# Patient Record
Sex: FEMALE | Race: BLACK OR AFRICAN AMERICAN | NOT HISPANIC OR LATINO | Employment: FULL TIME | URBAN - METROPOLITAN AREA
[De-identification: names, ages, dates, MRNs, and addresses within clinical notes are randomized per-mention and may not be internally consistent; named-entity substitution may affect disease eponyms.]

---

## 2023-03-28 PROBLEM — O09.30 LATE PRENATAL CARE (HHS-HCC): Status: ACTIVE | Noted: 2023-03-28

## 2023-03-28 PROBLEM — O99.519 ASTHMA AFFECTING PREGNANCY, ANTEPARTUM (HHS-HCC): Status: ACTIVE | Noted: 2023-03-28

## 2023-03-28 PROBLEM — O09.521 AMA (ADVANCED MATERNAL AGE) MULTIGRAVIDA 35+, FIRST TRIMESTER (HHS-HCC): Status: ACTIVE | Noted: 2023-03-28

## 2023-03-28 PROBLEM — O09.523 AMA (ADVANCED MATERNAL AGE) MULTIGRAVIDA 35+, THIRD TRIMESTER (HHS-HCC): Status: ACTIVE | Noted: 2023-03-28

## 2023-03-28 PROBLEM — O09.529 AMA (ADVANCED MATERNAL AGE) MULTIGRAVIDA 35+ (HHS-HCC): Status: ACTIVE | Noted: 2023-03-28

## 2023-03-28 PROBLEM — G44.209 TENSION HEADACHE: Status: ACTIVE | Noted: 2023-03-28

## 2023-03-28 PROBLEM — R87.612 LOW GRADE SQUAMOUS INTRAEPITHELIAL LESION ON CYTOLOGIC SMEAR OF CERVIX (LGSIL): Status: ACTIVE | Noted: 2023-03-28

## 2023-03-28 PROBLEM — N87.1 CIN II (CERVICAL INTRAEPITHELIAL NEOPLASIA II): Status: ACTIVE | Noted: 2023-03-28

## 2023-03-28 PROBLEM — O99.019 ANEMIA AFFECTING PREGNANCY (HHS-HCC): Status: ACTIVE | Noted: 2023-03-28

## 2023-03-28 PROBLEM — J45.909 ASTHMA AFFECTING PREGNANCY, ANTEPARTUM (HHS-HCC): Status: ACTIVE | Noted: 2023-03-28

## 2023-03-28 PROBLEM — R45.86 MOOD SWINGS: Status: ACTIVE | Noted: 2023-03-28

## 2023-03-28 RX ORDER — ALBUTEROL SULFATE 90 UG/1
1-2 AEROSOL, METERED RESPIRATORY (INHALATION)
COMMUNITY
Start: 2017-10-11

## 2023-03-28 RX ORDER — FERROUS SULFATE 325(65) MG
1 TABLET ORAL 2 TIMES DAILY
COMMUNITY
Start: 2018-03-08

## 2023-03-29 ENCOUNTER — TELEPHONE (OUTPATIENT)
Dept: PRIMARY CARE | Facility: CLINIC | Age: 43
End: 2023-03-29

## 2023-03-29 ENCOUNTER — OFFICE VISIT (OUTPATIENT)
Dept: PRIMARY CARE | Facility: CLINIC | Age: 43
End: 2023-03-29
Payer: COMMERCIAL

## 2023-03-29 VITALS
WEIGHT: 180 LBS | OXYGEN SATURATION: 98 % | SYSTOLIC BLOOD PRESSURE: 116 MMHG | HEART RATE: 95 BPM | HEIGHT: 67 IN | BODY MASS INDEX: 28.25 KG/M2 | RESPIRATION RATE: 16 BRPM | TEMPERATURE: 98.4 F | DIASTOLIC BLOOD PRESSURE: 81 MMHG

## 2023-03-29 DIAGNOSIS — R10.33 PERIUMBILICAL ABDOMINAL PAIN: Primary | ICD-10-CM

## 2023-03-29 DIAGNOSIS — K82.8 PORCELAIN GALLBLADDER: Primary | ICD-10-CM

## 2023-03-29 DIAGNOSIS — K59.00 CONSTIPATION, UNSPECIFIED CONSTIPATION TYPE: ICD-10-CM

## 2023-03-29 DIAGNOSIS — K80.61 CALCULUS OF GALLBLADDER AND BILE DUCT WITH CHOLECYSTITIS WITH OBSTRUCTION, UNSPECIFIED CHOLECYSTITIS ACUITY: ICD-10-CM

## 2023-03-29 PROCEDURE — 99203 OFFICE O/P NEW LOW 30 MIN: CPT | Performed by: STUDENT IN AN ORGANIZED HEALTH CARE EDUCATION/TRAINING PROGRAM

## 2023-03-29 RX ORDER — DOCUSATE SODIUM 100 MG/1
100 CAPSULE, LIQUID FILLED ORAL 3 TIMES DAILY PRN
Qty: 60 CAPSULE | Refills: 2 | Status: SHIPPED | OUTPATIENT
Start: 2023-03-29 | End: 2023-04-28

## 2023-03-29 ASSESSMENT — ENCOUNTER SYMPTOMS
ANAL BLEEDING: 0
NUMBNESS: 0
BLOOD IN STOOL: 0
DIZZINESS: 0
HEMATURIA: 0
ACTIVITY CHANGE: 0
WHEEZING: 0
ABDOMINAL PAIN: 1
DYSURIA: 0
WEAKNESS: 0
SHORTNESS OF BREATH: 0
RECTAL PAIN: 0
DIARRHEA: 0
CONSTIPATION: 1
VOMITING: 1
FEVER: 0
ABDOMINAL DISTENTION: 0
COUGH: 0
NAUSEA: 1
SPEECH DIFFICULTY: 0

## 2023-03-29 NOTE — PATIENT INSTRUCTIONS
It was nice meeting you today!    I have ordered a CT scan with contrast. As discussed, we have instances where people develop some allergic reactions to the contrast. If you experience this, you will be taken to the ER for further management.  I have also ordered some bloodwork, please get them done  If you experience sever abdominal pain or have nausea and vomiting, please proceed to the ER / seek medical attention immediately.

## 2023-03-29 NOTE — PROGRESS NOTES
"Subjective   Patient ID: Radha Holland is a 42 y.o. female who presents for abdominal pain   HPI  Here to establish care   Ongoing right upper quadrant and periumbilical abdominal pain since mid of feb  Reprots squeezing like pain, non diating , no agg or releiving factors   Ass with Nausea/ NBNB vomiting   Also reports constipation on and off and hard, stools since the start of this pain   No weightloss/ appetite loss;   Pain not related to food per patient    Meds: Alb PRN and BCP  Review of Systems   Constitutional:  Negative for activity change and fever.   HENT:  Negative for congestion.    Respiratory:  Negative for cough, shortness of breath and wheezing.    Cardiovascular:  Negative for chest pain and leg swelling.   Gastrointestinal:  Positive for abdominal pain, constipation, nausea and vomiting. Negative for abdominal distention, anal bleeding, blood in stool, diarrhea and rectal pain.   Endocrine: Negative for cold intolerance.   Genitourinary:  Negative for dysuria, hematuria and urgency.   Neurological:  Negative for dizziness, speech difficulty, weakness and numbness.   Psychiatric/Behavioral:  Negative for self-injury and suicidal ideas.        Objective   Visit Vitals  /81   Pulse 95   Temp 36.9 °C (98.4 °F)   Resp 16   Ht 1.689 m (5' 6.5\")   Wt 81.6 kg (180 lb)   SpO2 98%   BMI 28.62 kg/m²   OB Status Having periods   Smoking Status Never   BSA 1.96 m²      Physical Exam  HENT:      Head: Normocephalic and atraumatic.      Right Ear: Tympanic membrane normal.      Left Ear: Tympanic membrane normal.      Nose: Nose normal.      Mouth/Throat:      Mouth: Mucous membranes are moist.   Eyes:      Extraocular Movements: Extraocular movements intact.      Conjunctiva/sclera: Conjunctivae normal.      Pupils: Pupils are equal, round, and reactive to light.   Cardiovascular:      Rate and Rhythm: Normal rate and regular rhythm.      Pulses: Normal pulses.      Heart sounds: Normal heart sounds. "   Pulmonary:      Effort: Pulmonary effort is normal.      Breath sounds: Normal breath sounds. No stridor. No rhonchi.   Abdominal:      General: Bowel sounds are normal.      Palpations: Abdomen is soft.      Tenderness: There is abdominal tenderness (right upper quadrant and periumbilical). There is no right CVA tenderness, left CVA tenderness, guarding or rebound.   Musculoskeletal:      Cervical back: Neck supple.   Neurological:      Mental Status: She is oriented to person, place, and time.   Psychiatric:         Mood and Affect: Mood normal.         Behavior: Behavior normal.         Assessment/Plan         Problem List Items Addressed This Visit          Nervous    Periumbilical abdominal pain - Primary    Overview     Ongoing since feb  Worsening  Last episode of pain yesterday  Had nausea nad 1 episode of vomiting  No fever    Exam: RUQ and priumbilical tenderness  Will get STAT CT   Verbalizes understanding about contrast side effects  To goto ER if worsening of symptoms . Verbalizes understanding  Will get labs incl lipase         Relevant Orders    CT abdomen pelvis w IV contrast (Completed)    Comprehensive Metabolic Panel    CBC    Lipase     Other Visit Diagnoses       Constipation, unspecified constipation type               Patient to seek medical attention immediately / call 911  if has worsening of symptoms  or develops alarm symptoms as discussed. Verbalizes understanding

## 2023-03-29 NOTE — TELEPHONE ENCOUNTER
Result Communication    Resulted Orders   CT abdomen pelvis w IV contrast    Narrative    Interpreted By:  ROXANN WALLIS MD  MRN: [id number]  Patient Name: LATISHA STYLES     STUDY:  CT ABDOMEN AND PELVIS W IV CONTRAST;  3/29/2023 1:07 pm     INDICATION:  periumbilical pain with nausea dn vomiting; r/o GB path .     COMPARISON:  None.     ACCESSION NUMBER(S):  [id number]     ORDERING CLINICIAN:  GUILLE CURIEL     TECHNIQUE:  CT of the abdomen and pelvis was performed.  75 mL Omnipaque 350     FINDINGS:  LOWER CHEST:  Images of the lung bases show no infiltrate or pleural fluid.     ABDOMEN:     LIVER:  There is a lobular area of enhancement involving the dome of the  right lobe of the liver. This appears vascular in origin such as  vascular malformation.     BILE DUCTS:  There is no intrahepatic, common hepatic or common bile ductal  dilatation.     GALLBLADDER:  There are multiple gallstones in the distal body and fundus of the  gallbladder.  There is partial calcification of the wall of the gallbladder  consistent with porcelain gallbladder. This does not appear masslike  on CT imaging.     PANCREAS:  The pancreas is unremarkable.     SPLEEN:  The spleen is unremarkable. There is no splenic mass or splenomegaly.     ADRENAL GLANDS:  The adrenal glands are unremarkable.     KIDNEYS AND URETERS:  The kidneys function symmetrically.  The kidneys demonstrate no mass.  There is no intrarenal calculus or hydronephrosis.     BOWEL:  There is no bowel wall thickening, dilatation or obstruction.  There is a moderate amount of stool throughout the colon.     VESSELS:  The abdominal and pelvic vessels are unremarkable.     PERITONEUM/RETROPERITONEUM/LYMPH NODES:  There is no retroperitoneal or pelvic adenopathy.  There is no  ascites.     ABDOMINAL WALL:  The abdominal wall is unremarkable.     BONE AND SOFT TISSUE:  There is no acute osseous finding.     There is no soft tissue abnormality.       Impression     1. Cholelithiasis. Porcelain gallbladder.  2. Constipation.  3. Nonspecific lobular area of enhancement right lobe of the liver.  This is not the enhancement characteristics of a hemangioma. This has  the appearance of a vascular malformation.       2:26 PM      Results were successfully communicated with the patient and they acknowledged their understanding.

## 2023-04-07 ENCOUNTER — LAB (OUTPATIENT)
Dept: LAB | Facility: LAB | Age: 43
End: 2023-04-07
Payer: COMMERCIAL

## 2023-04-07 ENCOUNTER — APPOINTMENT (OUTPATIENT)
Dept: LAB | Facility: LAB | Age: 43
End: 2023-04-07
Payer: COMMERCIAL

## 2023-04-07 DIAGNOSIS — R10.33 PERIUMBILICAL ABDOMINAL PAIN: ICD-10-CM

## 2023-04-07 LAB
ALANINE AMINOTRANSFERASE (SGPT) (U/L) IN SER/PLAS: 157 U/L (ref 7–45)
ALANINE AMINOTRANSFERASE (SGPT) (U/L) IN SER/PLAS: 159 U/L (ref 7–45)
ALBUMIN (G/DL) IN SER/PLAS: 3.5 G/DL (ref 3.4–5)
ALBUMIN (G/DL) IN SER/PLAS: 3.7 G/DL (ref 3.4–5)
ALKALINE PHOSPHATASE (U/L) IN SER/PLAS: 111 U/L (ref 33–110)
ALKALINE PHOSPHATASE (U/L) IN SER/PLAS: 115 U/L (ref 33–110)
ANION GAP IN SER/PLAS: 12 MMOL/L (ref 10–20)
ANION GAP IN SER/PLAS: 13 MMOL/L (ref 10–20)
ASPARTATE AMINOTRANSFERASE (SGOT) (U/L) IN SER/PLAS: 124 U/L (ref 9–39)
ASPARTATE AMINOTRANSFERASE (SGOT) (U/L) IN SER/PLAS: 125 U/L (ref 9–39)
BILIRUBIN TOTAL (MG/DL) IN SER/PLAS: 2.4 MG/DL (ref 0–1.2)
BILIRUBIN TOTAL (MG/DL) IN SER/PLAS: 2.5 MG/DL (ref 0–1.2)
CALCIUM (MG/DL) IN SER/PLAS: 9.2 MG/DL (ref 8.6–10.6)
CALCIUM (MG/DL) IN SER/PLAS: 9.4 MG/DL (ref 8.6–10.6)
CARBON DIOXIDE, TOTAL (MMOL/L) IN SER/PLAS: 24 MMOL/L (ref 21–32)
CARBON DIOXIDE, TOTAL (MMOL/L) IN SER/PLAS: 26 MMOL/L (ref 21–32)
CHLORIDE (MMOL/L) IN SER/PLAS: 103 MMOL/L (ref 98–107)
CHLORIDE (MMOL/L) IN SER/PLAS: 104 MMOL/L (ref 98–107)
CREATININE (MG/DL) IN SER/PLAS: 0.87 MG/DL (ref 0.5–1.05)
CREATININE (MG/DL) IN SER/PLAS: 0.9 MG/DL (ref 0.5–1.05)
ERYTHROCYTE DISTRIBUTION WIDTH (RATIO) BY AUTOMATED COUNT: 22.2 % (ref 11.5–14.5)
ERYTHROCYTE DISTRIBUTION WIDTH (RATIO) BY AUTOMATED COUNT: 22.4 % (ref 11.5–14.5)
ERYTHROCYTE MEAN CORPUSCULAR HEMOGLOBIN CONCENTRATION (G/DL) BY AUTOMATED: 29.3 G/DL (ref 32–36)
ERYTHROCYTE MEAN CORPUSCULAR HEMOGLOBIN CONCENTRATION (G/DL) BY AUTOMATED: 29.7 G/DL (ref 32–36)
ERYTHROCYTE MEAN CORPUSCULAR VOLUME (FL) BY AUTOMATED COUNT: 70 FL (ref 80–100)
ERYTHROCYTE MEAN CORPUSCULAR VOLUME (FL) BY AUTOMATED COUNT: 70 FL (ref 80–100)
ERYTHROCYTES (10*6/UL) IN BLOOD BY AUTOMATED COUNT: 5.06 X10E12/L (ref 4–5.2)
ERYTHROCYTES (10*6/UL) IN BLOOD BY AUTOMATED COUNT: 5.06 X10E12/L (ref 4–5.2)
GFR FEMALE: 82 ML/MIN/1.73M2
GFR FEMALE: 85 ML/MIN/1.73M2
GLUCOSE (MG/DL) IN SER/PLAS: 128 MG/DL (ref 74–99)
GLUCOSE (MG/DL) IN SER/PLAS: 88 MG/DL (ref 74–99)
HEMATOCRIT (%) IN BLOOD BY AUTOMATED COUNT: 35.4 % (ref 36–46)
HEMATOCRIT (%) IN BLOOD BY AUTOMATED COUNT: 35.5 % (ref 36–46)
HEMOGLOBIN (G/DL) IN BLOOD: 10.4 G/DL (ref 12–16)
HEMOGLOBIN (G/DL) IN BLOOD: 10.5 G/DL (ref 12–16)
LEUKOCYTES (10*3/UL) IN BLOOD BY AUTOMATED COUNT: 4.3 X10E9/L (ref 4.4–11.3)
LEUKOCYTES (10*3/UL) IN BLOOD BY AUTOMATED COUNT: 5.3 X10E9/L (ref 4.4–11.3)
LIPASE (U/L) IN SER/PLAS: 44 U/L (ref 9–82)
NRBC (PER 100 WBCS) BY AUTOMATED COUNT: 0 /100 WBC (ref 0–0)
NRBC (PER 100 WBCS) BY AUTOMATED COUNT: 0 /100 WBC (ref 0–0)
PLATELETS (10*3/UL) IN BLOOD AUTOMATED COUNT: 394 X10E9/L (ref 150–450)
PLATELETS (10*3/UL) IN BLOOD AUTOMATED COUNT: 398 X10E9/L (ref 150–450)
POTASSIUM (MMOL/L) IN SER/PLAS: 4.6 MMOL/L (ref 3.5–5.3)
POTASSIUM (MMOL/L) IN SER/PLAS: 5 MMOL/L (ref 3.5–5.3)
PROTEIN TOTAL: 7 G/DL (ref 6.4–8.2)
PROTEIN TOTAL: 7.1 G/DL (ref 6.4–8.2)
SODIUM (MMOL/L) IN SER/PLAS: 136 MMOL/L (ref 136–145)
SODIUM (MMOL/L) IN SER/PLAS: 136 MMOL/L (ref 136–145)
UREA NITROGEN (MG/DL) IN SER/PLAS: 10 MG/DL (ref 6–23)
UREA NITROGEN (MG/DL) IN SER/PLAS: 10 MG/DL (ref 6–23)

## 2023-04-07 PROCEDURE — 83690 ASSAY OF LIPASE: CPT

## 2023-04-07 PROCEDURE — 85027 COMPLETE CBC AUTOMATED: CPT

## 2023-04-07 PROCEDURE — 36415 COLL VENOUS BLD VENIPUNCTURE: CPT

## 2023-04-07 PROCEDURE — 80053 COMPREHEN METABOLIC PANEL: CPT

## 2023-04-20 LAB
ALANINE AMINOTRANSFERASE (SGPT) (U/L) IN SER/PLAS: 241 U/L (ref 7–45)
ALBUMIN (G/DL) IN SER/PLAS: 3.8 G/DL (ref 3.4–5)
ALKALINE PHOSPHATASE (U/L) IN SER/PLAS: 109 U/L (ref 33–110)
ASPARTATE AMINOTRANSFERASE (SGOT) (U/L) IN SER/PLAS: 174 U/L (ref 9–39)
BILIRUBIN DIRECT (MG/DL) IN SER/PLAS: 0.6 MG/DL (ref 0–0.3)
BILIRUBIN TOTAL (MG/DL) IN SER/PLAS: 1.5 MG/DL (ref 0–1.2)
PROTEIN TOTAL: 7 G/DL (ref 6.4–8.2)

## 2023-05-04 ENCOUNTER — HOSPITAL ENCOUNTER (OUTPATIENT)
Dept: DATA CONVERSION | Facility: HOSPITAL | Age: 43
End: 2023-05-04
Attending: SURGERY | Admitting: SURGERY
Payer: COMMERCIAL

## 2023-05-04 DIAGNOSIS — K80.10 CALCULUS OF GALLBLADDER WITH CHRONIC CHOLECYSTITIS WITHOUT OBSTRUCTION: ICD-10-CM

## 2023-05-04 DIAGNOSIS — J45.909 UNSPECIFIED ASTHMA, UNCOMPLICATED (HHS-HCC): ICD-10-CM

## 2023-05-04 DIAGNOSIS — K80.20 CALCULUS OF GALLBLADDER WITHOUT CHOLECYSTITIS WITHOUT OBSTRUCTION: ICD-10-CM

## 2023-05-24 LAB
COMPLETE PATHOLOGY REPORT: NORMAL
CONVERTED CLINICAL DIAGNOSIS-HISTORY: NORMAL
CONVERTED FINAL DIAGNOSIS: NORMAL
CONVERTED FINAL REPORT PDF LINK TO COPY AND PASTE: NORMAL
CONVERTED GROSS DESCRIPTION: NORMAL

## 2023-09-14 NOTE — H&P
History & Physical Reviewed:   Pregnant/Lactating:  ·  Are You Pregnant no   ·  Are You Currently Breastfeeding no     I have reviewed the History and Physical dated:  07-Apr-2023   History and Physical reviewed and relevant findings noted. Patient examined to review pertinent physical  findings.: No significant changes   Home Medications Reviewed: no changes noted   Allergies Reviewed: no changes noted       ERAS (Enhanced Recovery After Surgery):  ·  ERAS Patient: no     Consent:   COVID-19 Consent:  ·  COVID-19 Risk Consent Surgeon has reviewed key risks related to the risk of betsey COVID-19 and if they contract COVID-19 what the risks are.     Attestation:   Note Completion:  I am a:  Resident/Fellow   Attending Attestation I saw and evaluated the patient.  I personally obtained the key and critical portions of the history and physical exam or was physically present for key and  critical portions performed by the resident/fellow. I reviewed the resident/fellow?s documentation and discussed the patient with the resident/fellow.  I agree with the resident/fellow?s medical decision making as documented in their note  with the exception/addition of the following:    I personally evaluated the patient on 04-May-2023   Comments/ Additional Findings    Pt seen in preop.  No changes since clinic visit          Electronic Signatures:  Dory Nicolas (Resident))  (Signed 04-May-2023 11:32)   Authored: History & Physical Reviewed, ERAS, Consent,  Note Completion  Franklin Patel)  (Signed 04-May-2023 12:16)   Authored: Note Completion   Co-Signer: History & Physical Reviewed, ERAS, Consent, Note Completion      Last Updated: 04-May-2023 12:16 by Franklin Patel)

## 2023-10-02 NOTE — OP NOTE
Post Operative Note:     PreOp Diagnosis: Gallstones   Post-Procedure Diagnosis: Same   Procedure: Lap Choly   Surgeon: Amanda   Resident/Fellow/Other Assistant: Maria Isabel   Anesthesia: GET   I.V. Fluids: 600 ml  crystalloid   Estimated Blood Loss (mL): none   Specimen: yes. GB   Findings: no acute inflamm     Attestation:   Note Completion:  Attending Attestation I was present for the entire procedure         Electronic Signatures:  Franklin Patel)  (Signed 04-May-2023 13:44)   Authored: Post Operative Note, Note Completion      Last Updated: 04-May-2023 13:44 by Franklin Patel)

## 2024-05-06 NOTE — OP NOTE
PREOPERATIVE DIAGNOSIS:  Gallstones.    POSTOPERATIVE DIAGNOSIS:  Gallstones.    OPERATION/PROCEDURE:  Laparoscopic cholecystectomy.    SURGEON:  Franklin Patel MD.    ASSISTANT(S):  Dr. Dory Nicolas.    ANESTHESIA:  General endotracheal.    INDICATIONS FOR PROCEDURE:  This patient is 43 with symptomatic gallstones.  Her information is  outlined in my dictated clinic note.     DESCRIPTION OF PROCEDURE:  The patient was brought to the operating room and placed on the  operating table in supine position.  A time-out was performed.  After  the induction of general endotracheal anesthesia, her abdomen was  prepped and draped in standard sterile fashion.  She was not given  intravenous antibiotics.  She did have sequential compression devices  placed.     We gained access to the abdominal cavity through a small  supraumbilical cutdown.  We safely inserted our balloon-tipped trocar  and insufflated to 15 mmHg.  We then inserted a 5 mm subxiphoid port  and two 5 mm right upper quadrant ports.     Using graspers through the right upper quadrant ports, we grasped and  retracted the gallbladder.  Using a [place] dissector through the  subxiphoid port, we began to dissect the areolar tissue away from the  cystic duct-gallbladder junction and gained length on it.  We did the  same to the cystic artery.  We furthermore developed the space  between the gallbladder and the liver such that we nicely saw 2  tubular structures going into the gallbladder and had a clear-cut  critical view for cholecystectomy.     We clipped the cystic duct twice on the staying side and once on the  specimen side and transected with scissors.  We clipped the cystic  artery twice on the staying side and simply transected above that  with the hook cautery.     We  the remainder of the gallbladder from the liver bed  using the hook cautery.  That was done with good hemostasis and  without entering the gallbladder.  No bile spilled.     We  removed the gallbladder from the abdominal cavity using an  EndoCatch bag.  We re-insufflated and inspected the gallbladder bed  and our clip.  There was no bleeding.  There was no bile.  The clips  were intact.     We removed ports under direct vision.  We desufflated the abdomen.  We closed the fascia at the umbilicus using 0 Vicryl suture.  We  infiltrated with local anesthetic.  Skin incisions were closed with  4-0 Vicryl subcuticular.  Mastisol, Steri-Strips, dry gauze, and  clear dressings were applied.  The plan at the time of my departure  from the operating room was for awakening the patient, extubation,  and transport to the recovery room.     Franklin Patel MD    DD:  05/04/2023 14:14:29 EST  DT:  05/04/2023 14:47:08 EST  DICTATION NUMBER:  [id number]  INTERNAL JOB NUMBER:  [id number]    CC:  NICK Patel MD        Electronic Signatures:  Franklin Patel) (Signed on 04-May-2023 17:13)   Authored  Unsigned, Draft (SYS GENERATED) (Entered on 04-May-2023 14:47)   Entered    Last Updated: 04-May-2023 17:13 by Franklin Patel)

## 2024-08-20 ENCOUNTER — HOSPITAL ENCOUNTER (OUTPATIENT)
Dept: RADIOLOGY | Facility: CLINIC | Age: 44
Discharge: HOME | End: 2024-08-20
Payer: COMMERCIAL

## 2024-08-20 DIAGNOSIS — Z12.31 SCREENING MAMMOGRAM FOR BREAST CANCER: ICD-10-CM

## 2024-08-20 PROCEDURE — 77063 BREAST TOMOSYNTHESIS BI: CPT | Performed by: RADIOLOGY

## 2024-08-20 PROCEDURE — 77067 SCR MAMMO BI INCL CAD: CPT | Performed by: RADIOLOGY

## 2024-08-20 PROCEDURE — 77067 SCR MAMMO BI INCL CAD: CPT
